# Patient Record
Sex: MALE | Race: WHITE | Employment: OTHER | URBAN - METROPOLITAN AREA
[De-identification: names, ages, dates, MRNs, and addresses within clinical notes are randomized per-mention and may not be internally consistent; named-entity substitution may affect disease eponyms.]

---

## 2018-08-06 ENCOUNTER — RECORDS - HEALTHEAST (OUTPATIENT)
Dept: LAB | Facility: CLINIC | Age: 83
End: 2018-08-06

## 2018-08-06 LAB
ANION GAP SERPL CALCULATED.3IONS-SCNC: 11 MMOL/L (ref 5–18)
BUN SERPL-MCNC: 30 MG/DL (ref 8–28)
CALCIUM SERPL-MCNC: 10 MG/DL (ref 8.5–10.5)
CHLORIDE BLD-SCNC: 104 MMOL/L (ref 98–107)
CO2 SERPL-SCNC: 29 MMOL/L (ref 22–31)
CREAT SERPL-MCNC: 1.5 MG/DL (ref 0.7–1.3)
GFR SERPL CREATININE-BSD FRML MDRD: 44 ML/MIN/1.73M2
GLUCOSE BLD-MCNC: 103 MG/DL (ref 70–125)
HBA1C MFR BLD: 5.4 % (ref 4.2–6.1)
HGB BLD-MCNC: 15.2 G/DL (ref 14–18)
POTASSIUM BLD-SCNC: 4.3 MMOL/L (ref 3.5–5)
SODIUM SERPL-SCNC: 144 MMOL/L (ref 136–145)

## 2018-09-07 ENCOUNTER — RECORDS - HEALTHEAST (OUTPATIENT)
Dept: LAB | Facility: CLINIC | Age: 83
End: 2018-09-07

## 2018-09-10 LAB
ANION GAP SERPL CALCULATED.3IONS-SCNC: 12 MMOL/L (ref 5–18)
BUN SERPL-MCNC: 19 MG/DL (ref 8–28)
CALCIUM SERPL-MCNC: 10.4 MG/DL (ref 8.5–10.5)
CHLORIDE BLD-SCNC: 101 MMOL/L (ref 98–107)
CO2 SERPL-SCNC: 27 MMOL/L (ref 22–31)
CREAT SERPL-MCNC: 1.22 MG/DL (ref 0.7–1.3)
GFR SERPL CREATININE-BSD FRML MDRD: 56 ML/MIN/1.73M2
GLUCOSE BLD-MCNC: 138 MG/DL (ref 70–125)
POTASSIUM BLD-SCNC: 4.3 MMOL/L (ref 3.5–5)
SODIUM SERPL-SCNC: 140 MMOL/L (ref 136–145)

## 2019-01-17 ENCOUNTER — RECORDS - HEALTHEAST (OUTPATIENT)
Dept: LAB | Facility: CLINIC | Age: 84
End: 2019-01-17

## 2019-01-17 LAB
25(OH)D3 SERPL-MCNC: 52.7 NG/ML (ref 30–80)
ALBUMIN SERPL-MCNC: 3.9 G/DL (ref 3.5–5)
ALP SERPL-CCNC: 104 U/L (ref 45–120)
ALT SERPL W P-5'-P-CCNC: 9 U/L (ref 0–45)
ANION GAP SERPL CALCULATED.3IONS-SCNC: 15 MMOL/L (ref 5–18)
AST SERPL W P-5'-P-CCNC: 19 U/L (ref 0–40)
BILIRUB SERPL-MCNC: 0.9 MG/DL (ref 0–1)
BUN SERPL-MCNC: 32 MG/DL (ref 8–28)
CALCIUM SERPL-MCNC: 9.7 MG/DL (ref 8.5–10.5)
CHLORIDE BLD-SCNC: 98 MMOL/L (ref 98–107)
CO2 SERPL-SCNC: 25 MMOL/L (ref 22–31)
CREAT SERPL-MCNC: 1.31 MG/DL (ref 0.7–1.3)
GFR SERPL CREATININE-BSD FRML MDRD: 51 ML/MIN/1.73M2
GLUCOSE BLD-MCNC: 122 MG/DL (ref 70–125)
HBA1C MFR BLD: 5.5 % (ref 4.2–6.1)
LDLC SERPL CALC-MCNC: 116 MG/DL
POTASSIUM BLD-SCNC: 4.7 MMOL/L (ref 3.5–5)
PROT SERPL-MCNC: 6.8 G/DL (ref 6–8)
SODIUM SERPL-SCNC: 138 MMOL/L (ref 136–145)

## 2019-02-25 ENCOUNTER — DOCUMENTATION ONLY (OUTPATIENT)
Dept: OTHER | Facility: CLINIC | Age: 84
End: 2019-02-25

## 2019-02-25 ENCOUNTER — AMBULATORY - HEALTHEAST (OUTPATIENT)
Dept: OTHER | Facility: CLINIC | Age: 84
End: 2019-02-25

## 2019-07-01 ENCOUNTER — RECORDS - HEALTHEAST (OUTPATIENT)
Dept: LAB | Facility: CLINIC | Age: 84
End: 2019-07-01

## 2019-07-01 LAB
ANION GAP SERPL CALCULATED.3IONS-SCNC: 14 MMOL/L (ref 5–18)
BUN SERPL-MCNC: 29 MG/DL (ref 8–28)
CALCIUM SERPL-MCNC: 10.1 MG/DL (ref 8.5–10.5)
CHLORIDE BLD-SCNC: 99 MMOL/L (ref 98–107)
CO2 SERPL-SCNC: 27 MMOL/L (ref 22–31)
CREAT SERPL-MCNC: 1.57 MG/DL (ref 0.7–1.3)
GFR SERPL CREATININE-BSD FRML MDRD: 42 ML/MIN/1.73M2
GLUCOSE BLD-MCNC: 147 MG/DL (ref 70–125)
HBA1C MFR BLD: 5.8 % (ref 4.2–6.1)
POTASSIUM BLD-SCNC: 4.3 MMOL/L (ref 3.5–5)
SODIUM SERPL-SCNC: 140 MMOL/L (ref 136–145)

## 2020-01-01 ENCOUNTER — TELEPHONE (OUTPATIENT)
Dept: GERIATRICS | Facility: CLINIC | Age: 85
End: 2020-01-01

## 2020-01-01 ENCOUNTER — VIRTUAL VISIT (OUTPATIENT)
Dept: GERIATRICS | Facility: CLINIC | Age: 85
End: 2020-01-01
Payer: MEDICARE

## 2020-01-01 ENCOUNTER — RECORDS - HEALTHEAST (OUTPATIENT)
Dept: LAB | Facility: CLINIC | Age: 85
End: 2020-01-01

## 2020-01-01 ENCOUNTER — NURSING HOME VISIT (OUTPATIENT)
Dept: FAMILY MEDICINE | Facility: CLINIC | Age: 85
End: 2020-01-01
Payer: MEDICARE

## 2020-01-01 ENCOUNTER — HOSPITAL LABORATORY (OUTPATIENT)
Facility: OTHER | Age: 85
End: 2020-01-01

## 2020-01-01 ENCOUNTER — TELEPHONE (OUTPATIENT)
Dept: FAMILY MEDICINE | Facility: CLINIC | Age: 85
End: 2020-01-01

## 2020-01-01 ENCOUNTER — DOCUMENTATION ONLY (OUTPATIENT)
Dept: OTHER | Facility: CLINIC | Age: 85
End: 2020-01-01

## 2020-01-01 VITALS
TEMPERATURE: 97.7 F | HEART RATE: 55 BPM | DIASTOLIC BLOOD PRESSURE: 75 MMHG | BODY MASS INDEX: 24.6 KG/M2 | HEIGHT: 70 IN | SYSTOLIC BLOOD PRESSURE: 119 MMHG | WEIGHT: 171.8 LBS | RESPIRATION RATE: 20 BRPM | OXYGEN SATURATION: 93 %

## 2020-01-01 VITALS
WEIGHT: 162.1 LBS | OXYGEN SATURATION: 96 % | RESPIRATION RATE: 12 BRPM | TEMPERATURE: 97.7 F | SYSTOLIC BLOOD PRESSURE: 136 MMHG | DIASTOLIC BLOOD PRESSURE: 83 MMHG | HEIGHT: 70 IN | BODY MASS INDEX: 23.21 KG/M2 | HEART RATE: 62 BPM

## 2020-01-01 VITALS
WEIGHT: 170.6 LBS | TEMPERATURE: 98.1 F | SYSTOLIC BLOOD PRESSURE: 124 MMHG | OXYGEN SATURATION: 97 % | BODY MASS INDEX: 24.48 KG/M2 | RESPIRATION RATE: 19 BRPM | HEART RATE: 52 BPM | DIASTOLIC BLOOD PRESSURE: 65 MMHG

## 2020-01-01 VITALS
OXYGEN SATURATION: 96 % | WEIGHT: 162.1 LBS | DIASTOLIC BLOOD PRESSURE: 83 MMHG | TEMPERATURE: 97 F | SYSTOLIC BLOOD PRESSURE: 136 MMHG | RESPIRATION RATE: 12 BRPM | BODY MASS INDEX: 23.26 KG/M2 | HEART RATE: 57 BPM

## 2020-01-01 VITALS
TEMPERATURE: 97.8 F | RESPIRATION RATE: 18 BRPM | OXYGEN SATURATION: 90 % | HEART RATE: 66 BPM | SYSTOLIC BLOOD PRESSURE: 137 MMHG | WEIGHT: 179 LBS | DIASTOLIC BLOOD PRESSURE: 84 MMHG

## 2020-01-01 VITALS
SYSTOLIC BLOOD PRESSURE: 112 MMHG | HEART RATE: 66 BPM | TEMPERATURE: 97.4 F | OXYGEN SATURATION: 94 % | BODY MASS INDEX: 23 KG/M2 | WEIGHT: 160.3 LBS | RESPIRATION RATE: 19 BRPM | DIASTOLIC BLOOD PRESSURE: 74 MMHG

## 2020-01-01 VITALS
SYSTOLIC BLOOD PRESSURE: 124 MMHG | BODY MASS INDEX: 24.24 KG/M2 | HEIGHT: 70 IN | HEART RATE: 67 BPM | RESPIRATION RATE: 18 BRPM | OXYGEN SATURATION: 98 % | DIASTOLIC BLOOD PRESSURE: 60 MMHG | WEIGHT: 169.3 LBS | TEMPERATURE: 98.1 F

## 2020-01-01 DIAGNOSIS — N18.30 TYPE 2 DIABETES MELLITUS WITH STAGE 3 CHRONIC KIDNEY DISEASE, WITHOUT LONG-TERM CURRENT USE OF INSULIN (H): ICD-10-CM

## 2020-01-01 DIAGNOSIS — C78.01 MALIGNANT NEOPLASM METASTATIC TO RIGHT LUNG (H): ICD-10-CM

## 2020-01-01 DIAGNOSIS — L89.600 PRESSURE INJURY OF HEEL, UNSTAGEABLE, UNSPECIFIED LATERALITY (H): ICD-10-CM

## 2020-01-01 DIAGNOSIS — C43.21 MALIGNANT MELANOMA OF RIGHT EAR (H): ICD-10-CM

## 2020-01-01 DIAGNOSIS — I10 ESSENTIAL HYPERTENSION: ICD-10-CM

## 2020-01-01 DIAGNOSIS — L89.301 PRESSURE INJURY OF BUTTOCK, STAGE 1, UNSPECIFIED LATERALITY: ICD-10-CM

## 2020-01-01 DIAGNOSIS — E11.22 TYPE 2 DIABETES MELLITUS WITH STAGE 3 CHRONIC KIDNEY DISEASE, WITHOUT LONG-TERM CURRENT USE OF INSULIN (H): ICD-10-CM

## 2020-01-01 DIAGNOSIS — M15.0 PRIMARY OSTEOARTHRITIS INVOLVING MULTIPLE JOINTS: ICD-10-CM

## 2020-01-01 DIAGNOSIS — D62 ACUTE BLOOD LOSS ANEMIA: ICD-10-CM

## 2020-01-01 DIAGNOSIS — F41.9 ANXIETY: ICD-10-CM

## 2020-01-01 DIAGNOSIS — K92.2 ACUTE GI BLEEDING: ICD-10-CM

## 2020-01-01 DIAGNOSIS — Z51.5 HOSPICE CARE PATIENT: Primary | ICD-10-CM

## 2020-01-01 DIAGNOSIS — R13.10 DYSPHAGIA, UNSPECIFIED TYPE: ICD-10-CM

## 2020-01-01 DIAGNOSIS — R63.4 WEIGHT LOSS: ICD-10-CM

## 2020-01-01 DIAGNOSIS — E11.9 DIABETES MELLITUS TYPE 2, DIET-CONTROLLED (H): ICD-10-CM

## 2020-01-01 DIAGNOSIS — I26.99 ACUTE PULMONARY EMBOLISM WITHOUT ACUTE COR PULMONALE, UNSPECIFIED PULMONARY EMBOLISM TYPE (H): ICD-10-CM

## 2020-01-01 DIAGNOSIS — R91.8 HILAR MASS: Primary | ICD-10-CM

## 2020-01-01 DIAGNOSIS — M35.3 POLYMYALGIA RHEUMATICA (H): ICD-10-CM

## 2020-01-01 DIAGNOSIS — R91.8 HILAR MASS: ICD-10-CM

## 2020-01-01 DIAGNOSIS — Z51.5 HOSPICE CARE PATIENT: ICD-10-CM

## 2020-01-01 DIAGNOSIS — N18.30 CKD (CHRONIC KIDNEY DISEASE) STAGE 3, GFR 30-59 ML/MIN (H): ICD-10-CM

## 2020-01-01 DIAGNOSIS — D62 ANEMIA DUE TO BLOOD LOSS, ACUTE: ICD-10-CM

## 2020-01-01 DIAGNOSIS — U07.1 COVID-19: Primary | ICD-10-CM

## 2020-01-01 DIAGNOSIS — R54 FRAILTY: ICD-10-CM

## 2020-01-01 DIAGNOSIS — C74.90: ICD-10-CM

## 2020-01-01 DIAGNOSIS — L89.600 PRESSURE INJURY OF HEEL, UNSTAGEABLE, UNSPECIFIED LATERALITY (H): Primary | ICD-10-CM

## 2020-01-01 DIAGNOSIS — K92.2 ACUTE GI BLEEDING: Primary | ICD-10-CM

## 2020-01-01 DIAGNOSIS — F02.80 LATE ONSET ALZHEIMER'S DISEASE WITHOUT BEHAVIORAL DISTURBANCE (H): ICD-10-CM

## 2020-01-01 DIAGNOSIS — G30.1 LATE ONSET ALZHEIMER'S DISEASE WITHOUT BEHAVIORAL DISTURBANCE (H): ICD-10-CM

## 2020-01-01 LAB
25(OH)D3 SERPL-MCNC: 47.9 NG/ML (ref 30–80)
ANION GAP SERPL CALCULATED.3IONS-SCNC: 12 MMOL/L (ref 5–18)
ANION GAP SERPL CALCULATED.3IONS-SCNC: 7 MMOL/L (ref 3–14)
BUN SERPL-MCNC: 21 MG/DL (ref 7–30)
BUN SERPL-MCNC: 30 MG/DL (ref 8–28)
CALCIUM SERPL-MCNC: 10 MG/DL (ref 8.5–10.5)
CALCIUM SERPL-MCNC: 8.4 MG/DL (ref 8.5–10.1)
CHLORIDE BLD-SCNC: 102 MMOL/L (ref 98–107)
CHLORIDE SERPL-SCNC: 101 MMOL/L (ref 94–109)
CO2 SERPL-SCNC: 27 MMOL/L (ref 20–32)
CO2 SERPL-SCNC: 27 MMOL/L (ref 22–31)
CREAT SERPL-MCNC: 0.69 MG/DL (ref 0.66–1.25)
CREAT SERPL-MCNC: 1.5 MG/DL (ref 0.7–1.3)
ERYTHROCYTE [DISTWIDTH] IN BLOOD BY AUTOMATED COUNT: 12.8 % (ref 11–14.5)
GFR SERPL CREATININE-BSD FRML MDRD: 44 ML/MIN/1.73M2
GFR SERPL CREATININE-BSD FRML MDRD: 82 ML/MIN/{1.73_M2}
GLUCOSE BLD-MCNC: 92 MG/DL (ref 70–125)
GLUCOSE SERPL-MCNC: 72 MG/DL (ref 70–99)
HBA1C MFR BLD: 5.3 % (ref 4.2–6.1)
HCT VFR BLD AUTO: 45.7 % (ref 40–54)
HGB BLD-MCNC: 15.3 G/DL (ref 14–18)
MCH RBC QN AUTO: 32.1 PG (ref 27–34)
MCHC RBC AUTO-ENTMCNC: 33.5 G/DL (ref 32–36)
MCV RBC AUTO: 96 FL (ref 80–100)
PLATELET # BLD AUTO: 283 THOU/UL (ref 140–440)
PMV BLD AUTO: 9.8 FL (ref 8.5–12.5)
POTASSIUM BLD-SCNC: 4.3 MMOL/L (ref 3.5–5)
POTASSIUM SERPL-SCNC: 3.8 MMOL/L (ref 3.4–5.3)
RBC # BLD AUTO: 4.76 MILL/UL (ref 4.4–6.2)
SODIUM SERPL-SCNC: 135 MMOL/L (ref 133–144)
SODIUM SERPL-SCNC: 141 MMOL/L (ref 136–145)
WBC: 8 THOU/UL (ref 4–11)

## 2020-01-01 PROCEDURE — 99207 PR NO CHARGE LOS: CPT | Performed by: NURSE PRACTITIONER

## 2020-01-01 PROCEDURE — 99308 SBSQ NF CARE LOW MDM 20: CPT | Mod: GW | Performed by: NURSE PRACTITIONER

## 2020-01-01 PROCEDURE — 99305 1ST NF CARE MODERATE MDM 35: CPT | Mod: 95 | Performed by: FAMILY MEDICINE

## 2020-01-01 PROCEDURE — 99309 SBSQ NF CARE MODERATE MDM 30: CPT | Mod: 95 | Performed by: FAMILY MEDICINE

## 2020-01-01 PROCEDURE — 99309 SBSQ NF CARE MODERATE MDM 30: CPT | Mod: GW | Performed by: NURSE PRACTITIONER

## 2020-01-01 PROCEDURE — 99310 SBSQ NF CARE HIGH MDM 45: CPT | Mod: GW | Performed by: NURSE PRACTITIONER

## 2020-01-01 RX ORDER — HYDROCORTISONE ACETATE 25 MG/1
25 SUPPOSITORY RECTAL DAILY PRN
COMMUNITY

## 2020-01-01 RX ORDER — DEXAMETHASONE 2 MG/1
2 TABLET ORAL EVERY MORNING
COMMUNITY
End: 2020-01-01

## 2020-01-01 RX ORDER — MORPHINE SULFATE 100 %
2.5 POWDER (GRAM) MISCELLANEOUS EVERY 4 HOURS PRN
COMMUNITY
Start: 2020-01-01 | End: 2020-01-01

## 2020-01-01 RX ORDER — MORPHINE SULFATE 30 MG/1
2.5 TABLET ORAL EVERY 4 HOURS PRN
COMMUNITY
End: 2020-01-01

## 2020-01-01 RX ORDER — CHOLECALCIFEROL (VITAMIN D3) 50 MCG
2000 TABLET ORAL DAILY
COMMUNITY
End: 2020-01-01

## 2020-01-01 RX ORDER — MORPHINE SULFATE 30 MG/1
2.5 TABLET ORAL 2 TIMES DAILY
COMMUNITY
End: 2020-01-01

## 2020-01-01 RX ORDER — ACETAMINOPHEN 500 MG
1000 TABLET ORAL 3 TIMES DAILY
COMMUNITY
Start: 2020-01-01

## 2020-01-01 RX ORDER — LORAZEPAM 0.5 MG/1
0.5 TABLET ORAL EVERY 6 HOURS PRN
OUTPATIENT
Start: 2020-01-01

## 2020-01-01 RX ORDER — MORPHINE SULFATE 30 MG/1
5 TABLET ORAL 3 TIMES DAILY
COMMUNITY

## 2020-01-01 RX ORDER — LORAZEPAM 0.5 MG/1
0.5 TABLET ORAL EVERY 6 HOURS PRN
COMMUNITY

## 2020-01-01 RX ORDER — SENNOSIDES A AND B 8.6 MG/1
2 TABLET, FILM COATED ORAL 2 TIMES DAILY PRN
COMMUNITY
Start: 2020-01-01

## 2020-01-01 RX ORDER — DEXAMETHASONE 4 MG/1
4 TABLET ORAL
COMMUNITY

## 2020-01-01 RX ORDER — POLYETHYLENE GLYCOL 3350 17 G/17G
1 POWDER, FOR SOLUTION ORAL DAILY
COMMUNITY

## 2020-01-01 RX ORDER — AMLODIPINE BESYLATE 5 MG/1
2.5 TABLET ORAL DAILY
COMMUNITY
Start: 2020-01-01 | End: 2020-01-01

## 2020-01-01 RX ORDER — TRAMADOL HYDROCHLORIDE 50 MG/1
50 TABLET ORAL EVERY 4 HOURS PRN
COMMUNITY
Start: 2020-01-01 | End: 2020-01-01

## 2020-01-01 RX ORDER — LORAZEPAM 100 %
0.5 POWDER (GRAM) MISCELLANEOUS EVERY 6 HOURS PRN
COMMUNITY
Start: 2020-01-01 | End: 2020-01-01

## 2020-01-01 ASSESSMENT — MIFFLIN-ST. JEOR
SCORE: 1424.19
SCORE: 1391.53
SCORE: 1435.53

## 2020-06-25 NOTE — LETTER
6/25/2020        RE: Chaparro Foley  [address] [facility] [address]        [facility]  PRIMARY CARE PROVIDER AND CLINIC:  Outside, Provider No address on file  Chief Complaint   Patient presents with     group home Acute     [place] Medical Record Number:  [id number]  Place of Service where encounter took place:  No question data found.    HPI:    Chaparro Foley is a 89 year old  (9/15/1927),admitted to the above facility from  [facility] .  Hospital stay 6/8/2020 through 6/18/2020.  Admitted to this facility for  hospice.  HPI information obtained from: facility chart records, facility staff, patient report and Care Everywhere Epic chart review.  Current issues are:        BRIEF HOSPITAL COURSE     Chaparro Foley is a 89 y.o. male with a past medical history pertinent for CKD stage III, malignant melanoma of right ear (admit W 02/11 - 02/13/20 for a partial right auriculectomy and right neck dissection), hypertension, hyperlipidemia, and DJD of multiple joints was admitted to [facility] on 6/9/2020 with acute GI bleed- presumed diverticular bleed.         Three days prior to admit pt started having bright red rectal bleeding, with the blood mixed in with the stool. On the day before admission, the patient noted that the blood was then dark red, prompting him to present to the ED. Denies rectal pain. In ED, hemodynamically stable, labs notable for Na 134, Cl 96, BUN 36, creatinine 1.65. 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 Did also develop slight tachycardia, but otherwise asymptomatic. During this time  patient's Hgb trended down from 13.5 to 9.6.     GI was consulted and patient underwent EGD and colonoscopy on - neither showed signs of bleeding. Tagged RBC scan also showed without evidence of active GI bleed. CT Enterography done  and showed no obvious source of bleeding. Pill cam was done on 6/15 showed no evidence of bleeding so diverticular bleed is the presumed etiology of pt's GI bleed. Pt was transfused one unit pRBC on . Hgb has been in the 9-10 range since with no further bleeding.    CT did show PE which was also noted on prior CT. Patient's not a candidate for anticoagulation given GI    CT scan findings of hilar and adrenal masses most likely due to metastatic melanoma. Pt had resection of ear and neck in 2020. He may have had CT scan of chest prior to that but I was unable to locate it. Pt aware of the CT scan findings. He said he would not want further evaluation of this.     His quality of life has been decreasing due to decreased mobility and pain from DJD of both upper and lower extremities. Pt lives in an assisted living apartment and his wife (with Alzheimer's) lives in the memory care and hasn't recognized him for a couple of years. His only child/son  several years ago. His closest person is Constantine who is also his POA. Constantine recently moved to [place] and he wants the patient to live closer to him. Pt was in agreement with this plan. Palliative Care was consulted as was Hospice (more for future support).     Pt's blood pressure running lower due to GI bleed so Atenolol and Maxzide were stopped and Amlodipine was decreased from 5 mg to 2.5 mg/d    Patient discharged to [facility] in [place] with [facility] support       Patient denies any pain, shortness of breath  Reports normal functioning of bowel and bladder   Appetite is good  Using EZ lift for transfers   Reports buttocks is sore but declines to have me assess it     CODE STATUS/ADVANCE DIRECTIVES  DISCUSSION:   DNR / DNI  Patient's living condition: lives in a skilled nursing facility    ALLERGIES:Lisinopril; Losartan; Atorvastatin; and Penicillins  PAST MEDICAL HISTORY:  has no past medical history on file.  PAST SURGICAL HISTORY:  has no past surgical history on file.  FAMILY HISTORY: family history is not on file.  SOCIAL HISTORY:      Post Discharge Medication Reconciliation Status: discharge medications reconciled, continue medications without change.  Current Outpatient Medications   Medication Sig Dispense Refill     acetaminophen (TYLENOL) 500 MG tablet Take 1,000 mg by mouth 3 times daily       Lorazepam POWD 0.5 mg by Oral or Feeding Tube route every 6 hours as needed       morphine powder Place 2.5 mg under the tongue every 4 hours as needed       senna (SENNA-TIME) 8.6 MG tablet Take 17.2 mg by mouth 2 times daily as needed       traMADol (ULTRAM) 50 MG tablet Take 50 mg by mouth every 4 hours as needed       amLODIPine (NORVASC) 5 MG tablet 2.5 mg by Oral or Feeding Tube route daily       Ca Phosphate-Cholecalciferol (CALCIUM/VITAMIN D3 GUMMIES) 200-200 MG-UNIT CHEW 1 Dose by Oral or Feeding Tube route daily         ROS:  10 point ROS of systems including Constitutional, Eyes, Respiratory, Cardiovascular, Gastroenterology, Genitourinary, Integumentary, Musculoskeletal, Psychiatric were all negative except for pertinent positives noted in my HPI.    Exam:  /84   Pulse 66   Temp 97.8  F (36.6  C)   Resp 18   Wt 81.2 kg (179 lb)   SpO2 90%   GENERAL APPEARANCE:  Alert, in no distress  EYES:  EOM, conjunctivae, lids, pupils and irises normal  RESP:  respiratory effort and palpation of chest normal, lungs clear to auscultation , no respiratory distress  CV:  Palpation and auscultation of heart done , regular rate and rhythm, no murmur, rub, or gallop  ABDOMEN:  normal bowel sounds, soft, nontender, no hepatosplenomegaly or other masses  M/S:   Gait and station abnormal EZ lift  Digits and  nails normal  SKIN:  Inspection of skin and subcutaneous tissue baseline, Palpation of skin and subcutaneous tissue baseline  NEURO:   Cranial nerves 2-12 are normal tested and grossly at patient's baseline  PSYCH:  oriented X 3          ASSESSMENT/PLAN:  Hospice care patient  Hilar mass     CT Abdomen pelvis showed new lobulated low-density right hilar mass, worrisome for metastatic lymphadenopathy, new nodular enlargement of both adrenal glands 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- patient declined further work up   Palliative consulted while inpatient   Patient is now enrolled in hospice through [facility]   Symptoms well managed     Acute GI bleeding  Acute blood loss anemia  Felt to be diverticular bleeding   No active bleeding currently     Acute pulmonary embolism without acute cor pulmonale, unspecified pulmonary embolism type (H)  Noted on CT scan   Not on  Due to goals of care and GI bleed    Primary osteoarthritis involving multiple joints  On tylenol scheduled and tramadol  Requires EZ lift for transfers and extensive assistance from nursing with ADLS     Malignant melanoma of right ear (H)  S/p resection in 2016    Type 2 diabetes mellitus with stage 3 chronic kidney disease, without long-term current use of insulin (H)  Diet controlled     CKD (chronic kidney disease) stage 3, GFR 30-59 ml/min (H)  Avoid nephrotoxic medication        Orders:  Nursing to discuss pressure concerns with hospice and possible cushion for recliner.     Total time spent with patient visit at the skilled nursing facility was 35 min including patient visit and review of past records. Greater than 50% of total time spent with counseling and coordinating care due to new admssion.     Electronically signed by:  Mili Hammond NP                    Sincerely,        Mili Hammond NP

## 2020-06-28 PROBLEM — D62 ACUTE BLOOD LOSS ANEMIA: Status: ACTIVE | Noted: 2020-01-01

## 2020-06-28 PROBLEM — E78.5 HYPERLIPIDEMIA: Status: ACTIVE | Noted: 2020-01-01

## 2020-06-28 PROBLEM — E11.22 DIABETES MELLITUS WITH CHRONIC KIDNEY DISEASE (H): Status: ACTIVE | Noted: 2020-01-01

## 2020-06-28 PROBLEM — M15.9 OSTEOARTHRITIS OF MULTIPLE JOINTS: Status: ACTIVE | Noted: 2020-01-01

## 2020-06-28 PROBLEM — R91.8 HILAR MASS: Status: ACTIVE | Noted: 2020-01-01

## 2020-06-28 PROBLEM — I26.99 ACUTE PULMONARY EMBOLISM WITHOUT ACUTE COR PULMONALE (H): Status: ACTIVE | Noted: 2020-01-01

## 2020-06-28 PROBLEM — K92.2 ACUTE GI BLEEDING: Status: ACTIVE | Noted: 2020-01-01

## 2020-06-28 NOTE — TELEPHONE ENCOUNTER
ON-CALL  GERIATRICS CROSS-COVERAGE NOTE:    Report from RN that patient had a fall.  Reportedly, he had pushed the wrong button on his lift chair.  Patient did not hit his head and there is no signs or symptoms of traumatic injury per RN.  He is currently enrolled with hospice and does not take aspirin or anticoagulation medication.    Plan:  Monitor for mental status change or other symptoms indicative of traumatic injury    Brett Galloway MD

## 2020-06-29 NOTE — PROGRESS NOTES
[facility]  PRIMARY CARE PROVIDER AND CLINIC:  Outside, Provider No address on file  Chief Complaint   Patient presents with     prison Acute     [place] Medical Record Number:  [id number]  Place of Service where encounter took place:  No question data found.    HPI:    Chaparro Foley is a 89 year old  (9/15/1927),admitted to the above facility from  [facility] .  Hospital stay 6/8/2020 through 6/18/2020.  Admitted to this facility for  hospice.  HPI information obtained from: facility chart records, facility staff, patient report and Care Everywhere Epic chart review.  Current issues are:        BRIEF HOSPITAL COURSE     Chaparro Foley is a 89 y.o. male with a past medical history pertinent for CKD stage III, malignant melanoma of right ear (admit W 02/11 - 02/13/20 for a partial right auriculectomy and right neck dissection), hypertension, hyperlipidemia, and DJD of multiple joints was admitted to [facility] on 6/9/2020 with acute GI bleed- presumed diverticular bleed.         Three days prior to admit pt started having bright red rectal bleeding, with the blood mixed in with the stool. On the day before admission, the patient noted that the blood was then dark red, prompting him to present to the ED. Denies rectal pain. In ED, hemodynamically stable, labs notable for Na 134, Cl 96, BUN 36, creatinine 1.65. 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 Did also develop slight tachycardia, but otherwise asymptomatic. During this time patient's Hgb trended down from 13.5 to 9.6.     GI was consulted and patient underwent EGD  and colonoscopy on - neither showed signs of bleeding. Tagged RBC scan also showed without evidence of active GI bleed. CT Enterography done  and showed no obvious source of bleeding. Pill cam was done on 6/15 showed no evidence of bleeding so diverticular bleed is the presumed etiology of pt's GI bleed. Pt was transfused one unit pRBC on . Hgb has been in the 9-10 range since with no further bleeding.    CT did show PE which was also noted on prior CT. Patient's not a candidate for anticoagulation given GI    CT scan findings of hilar and adrenal masses most likely due to metastatic melanoma. Pt had resection of ear and neck in 2020. He may have had CT scan of chest prior to that but I was unable to locate it. Pt aware of the CT scan findings. He said he would not want further evaluation of this.     His quality of life has been decreasing due to decreased mobility and pain from DJD of both upper and lower extremities. Pt lives in an assisted living apartment and his wife (with Alzheimer's) lives in the memory care and hasn't recognized him for a couple of years. His only child/son  several years ago. His closest person is Constantine who is also his POA. Constantine recently moved to [place] and he wants the patient to live closer to him. Pt was in agreement with this plan. Palliative Care was consulted as was Hospice (more for future support).     Pt's blood pressure running lower due to GI bleed so Atenolol and Maxzide were stopped and Amlodipine was decreased from 5 mg to 2.5 mg/d    Patient discharged to [facility] in [place] with [facility] support       Patient denies any pain, shortness of breath  Reports normal functioning of bowel and bladder   Appetite is good  Using EZ lift for transfers   Reports buttocks is sore but declines to have me assess it     CODE STATUS/ADVANCE DIRECTIVES DISCUSSION:   DNR / DNI  Patient's living condition: lives in a skilled nursing  facility    ALLERGIES:Lisinopril; Losartan; Atorvastatin; and Penicillins  PAST MEDICAL HISTORY:  has no past medical history on file.  PAST SURGICAL HISTORY:  has no past surgical history on file.  FAMILY HISTORY: family history is not on file.  SOCIAL HISTORY:      Post Discharge Medication Reconciliation Status: discharge medications reconciled, continue medications without change.  Current Outpatient Medications   Medication Sig Dispense Refill     acetaminophen (TYLENOL) 500 MG tablet Take 1,000 mg by mouth 3 times daily       Lorazepam POWD 0.5 mg by Oral or Feeding Tube route every 6 hours as needed       morphine powder Place 2.5 mg under the tongue every 4 hours as needed       senna (SENNA-TIME) 8.6 MG tablet Take 17.2 mg by mouth 2 times daily as needed       traMADol (ULTRAM) 50 MG tablet Take 50 mg by mouth every 4 hours as needed       amLODIPine (NORVASC) 5 MG tablet 2.5 mg by Oral or Feeding Tube route daily       Ca Phosphate-Cholecalciferol (CALCIUM/VITAMIN D3 GUMMIES) 200-200 MG-UNIT CHEW 1 Dose by Oral or Feeding Tube route daily         ROS:  10 point ROS of systems including Constitutional, Eyes, Respiratory, Cardiovascular, Gastroenterology, Genitourinary, Integumentary, Musculoskeletal, Psychiatric were all negative except for pertinent positives noted in my HPI.    Exam:  /84   Pulse 66   Temp 97.8  F (36.6  C)   Resp 18   Wt 81.2 kg (179 lb)   SpO2 90%   GENERAL APPEARANCE:  Alert, in no distress  EYES:  EOM, conjunctivae, lids, pupils and irises normal  RESP:  respiratory effort and palpation of chest normal, lungs clear to auscultation , no respiratory distress  CV:  Palpation and auscultation of heart done , regular rate and rhythm, no murmur, rub, or gallop  ABDOMEN:  normal bowel sounds, soft, nontender, no hepatosplenomegaly or other masses  M/S:   Gait and station abnormal EZ lift  Digits and nails normal  SKIN:  Inspection of skin and subcutaneous tissue baseline,  Palpation of skin and subcutaneous tissue baseline  NEURO:   Cranial nerves 2-12 are normal tested and grossly at patient's baseline  PSYCH:  oriented X 3          ASSESSMENT/PLAN:  Hospice care patient  Hilar mass     CT Abdomen pelvis showed new lobulated low-density right hilar mass, worrisome for metastatic lymphadenopathy, new nodular enlargement of both adrenal glands 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- patient declined further work up   Palliative consulted while inpatient   Patient is now enrolled in hospice through [facility]   Symptoms well managed     Acute GI bleeding  Acute blood loss anemia  Felt to be diverticular bleeding   No active bleeding currently     Acute pulmonary embolism without acute cor pulmonale, unspecified pulmonary embolism type (H)  Noted on CT scan   Not on  Due to goals of care and GI bleed    Primary osteoarthritis involving multiple joints  On tylenol scheduled and tramadol  Requires EZ lift for transfers and extensive assistance from nursing with ADLS     Malignant melanoma of right ear (H)  S/p resection in 2016    Type 2 diabetes mellitus with stage 3 chronic kidney disease, without long-term current use of insulin (H)  Diet controlled     CKD (chronic kidney disease) stage 3, GFR 30-59 ml/min (H)  Avoid nephrotoxic medication        Orders:  Nursing to discuss pressure concerns with hospice and possible cushion for recliner.     Total time spent with patient visit at the skilled nursing facility was 35 min including patient visit and review of past records. Greater than 50% of total time spent with counseling and coordinating care due to new admssion.     Electronically signed by:  Mili Hammond NP

## 2020-07-15 PROBLEM — C79.70: Status: ACTIVE | Noted: 2020-01-01

## 2020-07-15 PROBLEM — Z51.5 ENCOUNTER FOR HOSPICE CARE: Status: ACTIVE | Noted: 2020-01-01

## 2020-07-15 PROBLEM — M35.3 POLYMYALGIA RHEUMATICA (H): Status: ACTIVE | Noted: 2020-01-01

## 2020-07-15 NOTE — PROGRESS NOTES
" [facility]  Chaparro Foley is being evaluated via a billable video visit due to the restrictions of the Covid-19 pandemic.   The patient has been notified of following:  \"This video visit will be conducted via a call between you and your provider. We have found that certain health care needs can be provided without the need for an in-person physical exam.  This service lets us provide the care you need with a video conversation. If during the course of the call the provider feels a video visit is not appropriate, you will not be charged for this service.\"   The provider has received verbal consent for a Video Visit from the patient and or first contact? Yes  Patient/facility staff would like the video invitation sent by: N/A   Video Start Time: 12:27  Which Facility the Patient is at during the time of visit: [facility]  PRIMARY CARE PROVIDER AND CLINIC: Ruby Vasquez MD, [address] [facility] [[email or web address] [address] / [place] [address]  Chief Complaint   Patient presents with     [facility] F/U     [place] Medical Record Number: [id number]  Chaparro Foley is a 89 year old  (9/15/1927), admitted to the above facility from  [facility] . Hospital stay 06/08/20 through 06/18/20. Admitted to this facility for rehab, medical management, nursing care and hospice.  HPI:    HPI information obtained from: facility staff, patient report and [facility] chart review.   Brief Summary of Hospital Course:   - Pt with PMH notable for Malignant melanoma s/p partial right auriculectomy and right neck dissection (Feb 2020), DJD, PE, Alzheimer disease (U resident),  hospitalized with Acute GIB presumably from diverticulosis.  CT revealed right hilar mass concerning for mets lymphadenopathy, and new b/l adrenal gland nodular enlargement c/w mets. Complicated with acute blood loss anemia, required one unit of PRBCs transfusion. Enrolled in hospice.     Updates on Status Since " "Skilled nursing Admission:   - I have ain in both knees, OA, better with morphine. \"My appetite is very good, BM is ok, sleep is fine as well.   - \" My mood is good.\"   - \" the bedsores over my buttock and over my heels are getting better\" RN reports patient has Mepilex on the heels    =========================================================  CODE STATUS/ADVANCE DIRECTIVES DISCUSSION: DNR / DNI  Patient's living condition: lives in an assisted living facility (apartment) with his spouse  ALLERGIES: Lisinopril; Losartan; Atorvastatin; and Penicillins  PAST MEDICAL HISTORY:  has a past medical history of Chronic kidney disease, Diabetes mellitus, type 2 (H), Hyperlipidemia, and Hypertension.  PAST SURGICAL HISTORY:   has a past surgical history that includes Cholecystectomy. EGD, c-scope, lap julián.  s/p partial right auriculectomy and right neck dissection (2020),  FAMILY HISTORY: family history includes Brain Tumor in his son; Heart Failure in his mother.father  from old age.   SOCIAL HISTORY:   former smoker quite in , does not drink alcohol. Lives in memory care unit.   Current Outpatient Medications   Medication Sig Dispense Refill     acetaminophen (TYLENOL) 500 MG tablet Take 1,000 mg by mouth 3 times daily       amLODIPine (NORVASC) 5 MG tablet 2.5 mg by Oral or Feeding Tube route daily       Ca Phosphate-Cholecalciferol (CALCIUM/VITAMIN D3 GUMMIES) 200-200 MG-UNIT CHEW 1 Dose by Oral or Feeding Tube route daily       Lorazepam POWD 0.5 mg by Oral or Feeding Tube route every 6 hours as needed       morphine powder Place 2.5 mg under the tongue every 4 hours as needed       senna (SENNA-TIME) 8.6 MG tablet Take 17.2 mg by mouth 2 times daily as needed       traMADol (ULTRAM) 50 MG tablet Take 50 mg by mouth every 4 hours as needed       ROS: Limited secondary to cognitive impairment but today pt reports- see HPI  Vitals:/75   Pulse 55   Temp 97.7  F (36.5  C)   Resp 20   Ht 1.778 m (5' " "10\")   Wt 77.9 kg (171 lb 12.8 oz)   SpO2 93%   BMI 24.65 kg/m     Limited Visit Exam done given COVID-19 precautions:  GENERAL APPEARANCE:  in no distress  RESP:  unlabored breathing  M/S:   no joint deformity noted  SKIN:  no rash noted  NEURO:   no purposeful movement in upper and lower extremities  PSYCH:  affect and mood normal    Lab/Diagnostic data: Reviewed in the chart and EHR.      ASSESSMENT/PLAN:  -----------------------------  Acute GI bleeding presumably from diverticular bleeding.   Acute blood loss anemia required blood transfusion  - clinically stable. At risk for re-bleeding. 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 Chronic condition, ongoing decline expected.   -  Continue to provide redirection and reassurance as needed. Maintain safe living situation with goals focused on comfort.    Unstageable B/L heel injury (H): wound care. Continue    Diet controlled Diabetes (H): no diet restriction    Primary osteoarthritis involving multiple joints  Polymyalgia rheumatic  Frailty  - analgesia optimal  - Significant  Deficits requiring NH placement. Requiring extensive assistance from nursing. 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 Requiring extensive assistance from nursing. Up for meals only o/w spends the day resting in bed    Order: See above, otherwise, continue the rest of the current POC.       Electronically signed by:  Ruby Vasquez MD     Video-Visit Details  Type of service:  Video Visit  Video End Time (time video stopped): 12:27  Distant Location (provider location):  [facility]   "

## 2020-07-15 NOTE — LETTER
"    7/15/2020        RE: Chaparro Foley  [address] [facility] [address]         [facility] [facility]  Chaparro Foley is being evaluated via a billable video visit due to the restrictions of the Covid-19 pandemic.   The patient has been notified of following:  \"This video visit will be conducted via a call between you and your provider. We have found that certain health care needs can be provided without the need for an in-person physical exam.  This service lets us provide the care you need with a video conversation. If during the course of the call the provider feels a video visit is not appropriate, you will not be charged for this service.\"   The provider has received verbal consent for a Video Visit from the patient and or first contact? Yes  Patient/facility staff would like the video invitation sent by: N/A   Video Start Time: 12:27  Which Facility the Patient is at during the time of visit: [facility]  PRIMARY CARE PROVIDER AND CLINIC: Ruby Vasquez MD, [address] [place] [address] / [facility] [address]  Chief Complaint   Patient presents with     [facility] F/U     [place] Medical Record Number: [id number]  Chaparro Foley is a 89 year old  (9/15/1927), admitted to the above facility from  [facility] . Hospital stay 06/08/20 through 06/18/20. Admitted to this facility for rehab, medical management, nursing care and hospice.  HPI:    HPI information obtained from: facility staff, patient report and [facility] chart review.   Brief Summary of Hospital Course:   - Pt with PMH notable for Malignant melanoma s/p partial right auriculectomy and right neck dissection (Feb 2020), DJD, PE, Alzheimer disease (U resident),  hospitalized with Acute GIB presumably from diverticulosis.  CT revealed right hilar mass concerning for mets lymphadenopathy, and new b/l adrenal gland nodular enlargement c/w mets. Complicated with acute blood loss anemia, " "required one unit of PRBCs transfusion. Enrolled in hospice.     Updates on Status Since Skilled nursing Admission:   - I have ain in both knees, OA, better with morphine. \"My appetite is very good, BM is ok, sleep is fine as well.   - \" My mood is good.\"   - \" the bedsores over my buttock and over my heels are getting better\" RN reports patient has Mepilex on the heels    =========================================================  CODE STATUS/ADVANCE DIRECTIVES DISCUSSION: DNR / DNI  Patient's living condition: lives in an assisted living facility (apartment) with his spouse  ALLERGIES: Lisinopril; Losartan; Atorvastatin; and Penicillins  PAST MEDICAL HISTORY:  has a past medical history of Chronic kidney disease, Diabetes mellitus, type 2 (H), Hyperlipidemia, and Hypertension.  PAST SURGICAL HISTORY:   has a past surgical history that includes Cholecystectomy. EGD, c-scope, lap julián.  s/p partial right auriculectomy and right neck dissection (2020),  FAMILY HISTORY: family history includes Brain Tumor in his son; Heart Failure in his mother.father  from old age.   SOCIAL HISTORY:   former smoker quite in , does not drink alcohol. Lives in memory care unit.   Current Outpatient Medications   Medication Sig Dispense Refill     acetaminophen (TYLENOL) 500 MG tablet Take 1,000 mg by mouth 3 times daily       amLODIPine (NORVASC) 5 MG tablet 2.5 mg by Oral or Feeding Tube route daily       Ca Phosphate-Cholecalciferol (CALCIUM/VITAMIN D3 GUMMIES) 200-200 MG-UNIT CHEW 1 Dose by Oral or Feeding Tube route daily       Lorazepam POWD 0.5 mg by Oral or Feeding Tube route every 6 hours as needed       morphine powder Place 2.5 mg under the tongue every 4 hours as needed       senna (SENNA-TIME) 8.6 MG tablet Take 17.2 mg by mouth 2 times daily as needed       traMADol (ULTRAM) 50 MG tablet Take 50 mg by mouth every 4 hours as needed       ROS: Limited secondary to cognitive impairment but today pt reports- see " "HPI  Vitals:/75   Pulse 55   Temp 97.7  F (36.5  C)   Resp 20   Ht 1.778 m (5' 10\")   Wt 77.9 kg (171 lb 12.8 oz)   SpO2 93%   BMI 24.65 kg/m     Limited Visit Exam done given COVID-19 precautions:  GENERAL APPEARANCE:  in no distress  RESP:  unlabored breathing  M/S:   no joint deformity noted  SKIN:  no rash noted  NEURO:   no purposeful movement in upper and lower extremities  PSYCH:  affect and mood normal    Lab/Diagnostic data: Reviewed in the chart and EHR.      ASSESSMENT/PLAN:  -----------------------------  Acute GI bleeding presumably from diverticular bleeding.   Acute blood loss anemia required blood transfusion  - clinically stable. At risk for re-bleeding. 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 Chronic condition, ongoing decline expected.   -  Continue to provide redirection and reassurance as needed. Maintain safe living situation with goals focused on comfort.    Unstageable B/L heel injury (H): wound care. Continue    Diet controlled Diabetes (H): no diet restriction    Primary osteoarthritis involving multiple joints  Polymyalgia rheumatic  Frailty  - analgesia optimal  - Significant  Deficits requiring NH placement. Requiring extensive assistance from nursing. 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 Requiring extensive assistance from nursing. Up for meals only o/w spends the day resting in bed    Order: See above, otherwise, continue the rest of the current POC.       Electronically signed by:  Ruby Vasquez MD     Video-Visit Details  Type of service:  Video Visit  Video End Time (time video stopped): 12:27  Distant Location (provider location):  [facility] "       Sincerely,        Ruby Vasquez MD

## 2020-07-30 NOTE — LETTER
7/30/2020        RE: Chaparro Foley  [address] [facility] [address]        [facility]  [place] Medical Record Number:  [id number]  Place of Service where encounter took place:  [facility] (S) [[id number]]  Chief Complaint   Patient presents with     Nursing Home Acute       HPI:    Chaparro Foley  is a 89 year old (9/15/1927), who is being seen today for an episodic care visit.  HPI information obtained from: facility chart records, facility staff and patient report. Today's concern is:    Patient was seen for face to face visit for as needed lorazepam   This was started by hospice (due to GI bleed and suspect metastatic cancer)   Per nursing patient has frequent episodes of anxiety- reporting that he is unable to breath, unable swallow, unable to talk at various times and appears in no such distress   He also reports at times that his legs cannot move as they are covered in blood- they are not     He has been given lorazepam 4 times this month and it has been noted to be effective.       Past Medical and Surgical History reviewed in Epic today.    MEDICATIONS:    Current Outpatient Medications   Medication Sig Dispense Refill     acetaminophen (TYLENOL) 500 MG tablet Take 1,000 mg by mouth 3 times daily       amLODIPine (NORVASC) 5 MG tablet 2.5 mg by Oral or Feeding Tube route daily       Ca Phosphate-Cholecalciferol (CALCIUM/VITAMIN D3 GUMMIES) 200-200 MG-UNIT CHEW 1 Dose by Oral or Feeding Tube route daily       Cholecalciferol (VITAMIN D3) 50 MCG (2000 UT) TABS Take 2,000 Units by mouth daily       dexamethasone (DECADRON) 2 MG tablet Take 2 mg by mouth every morning Give one tablet by mouth daily with meal       FLEET MINERAL OIL RE Place 133 mLs rectally daily as needed       hydrocortisone (ANUSOL-HC) 25 MG suppository Place 25 mg rectally daily as needed for hemorrhoids       LORazepam (ATIVAN) 0.5 MG tablet Take 0.5 mg by mouth every 6 hours as needed for  "agitation or anxiety       morphine 2.5 MG solu-tab Take 2.5 mg by mouth every 4 hours as needed for shortness of breath / dyspnea or moderate to severe pain       polyethylene glycol (MIRALAX) 17 g packet Take 1 packet by mouth daily       senna (SENNA-TIME) 8.6 MG tablet Take 2 tablets by mouth 2 times daily as needed for constipation Take 2 tabs PO daily Hold x 1 dose for loose stools bowel regulation       traMADol (ULTRAM) 50 MG tablet Take 50 mg by mouth every 4 hours as needed           REVIEW OF SYSTEMS:  4 point ROS including Respiratory, CV, GI and , other than that noted in the HPI,  is negative    Objective:  /60   Pulse 67   Temp 98.1  F (36.7  C)   Resp 18   Ht 1.778 m (5' 10\")   Wt 76.8 kg (169 lb 4.8 oz)   SpO2 98%   BMI 24.29 kg/m    Exam:  GENERAL APPEARANCE:  Alert, in no distress  CV:  Palpation and auscultation of heart done , regular rate and rhythm, no murmur, rub, or gallop  ABDOMEN:  normal bowel sounds, soft, nontender, no hepatosplenomegaly or other masses  PSYCH:  memory impaired , affect and mood normal    Labs:   Labs done in SNF are in [place] EPIC. Please refer to them using [employer] Everywhere.    ASSESSMENT/PLAN:  Hospice care patient  Acute GI bleeding, suspected metastatic cancer ?colon- declined further work up   [facility]      Anxiety  Continuation of PRN order for non-antipsychotic psychotropic medication,anxiety , is appropriate due to sporadic anxiety and is being reordered today with an end date of 60 days.  Nsg to update [facility] provider of frequency of use 7 days prior to end date.           Orders written by provider at facility      Total time spent with patient visit at the skilled nursing facility was 15 min including patient visit. Greater than 50% of total time spent with counseling and coordinating care due to need for face face for psychotropic. .  Electronically signed by:  Mili Hammond NP               Sincerely,        Mili Hammond, " NP

## 2020-07-30 NOTE — PROGRESS NOTES
[facility]  [place] Medical Record Number:  [id number]  Place of Service where encounter took place:  [facility] (S) [[id number]]  Chief Complaint   Patient presents with     Nursing Home Acute       HPI:    Chaparro Foley  is a 89 year old (9/15/1927), who is being seen today for an episodic care visit.  HPI information obtained from: facility chart records, facility staff and patient report. Today's concern is:    Patient was seen for face to face visit for as needed lorazepam   This was started by hospice (due to GI bleed and suspect metastatic cancer)   Per nursing patient has frequent episodes of anxiety- reporting that he is unable to breath, unable swallow, unable to talk at various times and appears in no such distress   He also reports at times that his legs cannot move as they are covered in blood- they are not     He has been given lorazepam 4 times this month and it has been noted to be effective.       Past Medical and Surgical History reviewed in Epic today.    MEDICATIONS:    Current Outpatient Medications   Medication Sig Dispense Refill     acetaminophen (TYLENOL) 500 MG tablet Take 1,000 mg by mouth 3 times daily       amLODIPine (NORVASC) 5 MG tablet 2.5 mg by Oral or Feeding Tube route daily       Ca Phosphate-Cholecalciferol (CALCIUM/VITAMIN D3 GUMMIES) 200-200 MG-UNIT CHEW 1 Dose by Oral or Feeding Tube route daily       Cholecalciferol (VITAMIN D3) 50 MCG (2000 UT) TABS Take 2,000 Units by mouth daily       dexamethasone (DECADRON) 2 MG tablet Take 2 mg by mouth every morning Give one tablet by mouth daily with meal       FLEET MINERAL OIL RE Place 133 mLs rectally daily as needed       hydrocortisone (ANUSOL-HC) 25 MG suppository Place 25 mg rectally daily as needed for hemorrhoids       LORazepam (ATIVAN) 0.5 MG tablet Take 0.5 mg by mouth every 6 hours as needed for agitation or anxiety       morphine 2.5 MG solu-tab Take 2.5 mg by mouth every 4 hours as  "needed for shortness of breath / dyspnea or moderate to severe pain       polyethylene glycol (MIRALAX) 17 g packet Take 1 packet by mouth daily       senna (SENNA-TIME) 8.6 MG tablet Take 2 tablets by mouth 2 times daily as needed for constipation Take 2 tabs PO daily Hold x 1 dose for loose stools bowel regulation       traMADol (ULTRAM) 50 MG tablet Take 50 mg by mouth every 4 hours as needed           REVIEW OF SYSTEMS:  4 point ROS including Respiratory, CV, GI and , other than that noted in the HPI,  is negative    Objective:  /60   Pulse 67   Temp 98.1  F (36.7  C)   Resp 18   Ht 1.778 m (5' 10\")   Wt 76.8 kg (169 lb 4.8 oz)   SpO2 98%   BMI 24.29 kg/m    Exam:  GENERAL APPEARANCE:  Alert, in no distress  CV:  Palpation and auscultation of heart done , regular rate and rhythm, no murmur, rub, or gallop  ABDOMEN:  normal bowel sounds, soft, nontender, no hepatosplenomegaly or other masses  PSYCH:  memory impaired , affect and mood normal    Labs:   Labs done in SNF are in [place] EPIC. Please refer to them using [employer] Everywhere.    ASSESSMENT/PLAN:  Hospice care patient  Acute GI bleeding, suspected metastatic cancer ?colon- declined further work up   [facility]      Anxiety  Continuation of PRN order for non-antipsychotic psychotropic medication,anxiety , is appropriate due to sporadic anxiety and is being reordered today with an end date of 60 days.  Nsg to update [facility] provider of frequency of use 7 days prior to end date.           Orders written by provider at facility      Total time spent with patient visit at the skilled nursing facility was 15 min including patient visit. Greater than 50% of total time spent with counseling and coordinating care due to need for face face for psychotropic. .  Electronically signed by:  Mili Hammond NP           "

## 2020-08-12 NOTE — PROGRESS NOTES
[place] [facility]  Chief Complaint   Patient presents with     care home Regulatory     [facility] Record Number:  [id number]  Place of Service where encounter took place:  [facility] (S) [[id number]]    HPI:    Chaparro Foley  is 89 year old (9/15/1927), who is being seen today for a federally mandated E/M visit.  HPI information obtained from: facility chart records, facility staff and patient report. Today's concerns are:    Patient denies any concerns  Denies any pain   Nursing reports that he will state state he cannot swallow and will refuse pills although he does not seem in any distress during such events       ALLERGIES:Lisinopril; Losartan; Atorvastatin; and Penicillins  PAST MEDICAL HISTORY:   has a past medical history of Chronic kidney disease, Diabetes mellitus, type 2 (H), Hyperlipidemia, and Hypertension.  PAST SURGICAL HISTORY:   has a past surgical history that includes Cholecystectomy.  FAMILY HISTORY: family history includes Brain Tumor in his son; Heart Failure in his mother.  SOCIAL HISTORY:      MEDICATIONS:  Current Outpatient Medications   Medication Sig Dispense Refill     acetaminophen (TYLENOL) 500 MG tablet Take 1,000 mg by mouth 3 times daily       amLODIPine (NORVASC) 5 MG tablet 2.5 mg by Oral or Feeding Tube route daily       Ca Phosphate-Cholecalciferol (CALCIUM/VITAMIN D3 GUMMIES) 200-200 MG-UNIT CHEW 1 Dose by Oral or Feeding Tube route daily       Cholecalciferol (VITAMIN D3) 50 MCG (2000 UT) TABS Take 2,000 Units by mouth daily       dexamethasone (DECADRON) 2 MG tablet Take 2 mg by mouth every morning Give one tablet by mouth daily with meal       FLEET MINERAL OIL RE Place 133 mLs rectally daily as needed       hydrocortisone (ANUSOL-HC) 25 MG suppository Place 25 mg rectally daily as needed for hemorrhoids       LORazepam (ATIVAN) 0.5 MG tablet Take 0.5 mg by mouth every 6 hours as needed for agitation or anxiety       morphine 2.5 MG  "solu-tab Take 2.5 mg by mouth every 4 hours as needed for shortness of breath / dyspnea or moderate to severe pain       polyethylene glycol (MIRALAX) 17 g packet Take 1 packet by mouth daily       senna (SENNA-TIME) 8.6 MG tablet Take 2 tablets by mouth 2 times daily as needed for constipation Take 2 tabs PO daily Hold x 1 dose for loose stools bowel regulation       traMADol (ULTRAM) 50 MG tablet Take 50 mg by mouth every 4 hours as needed           Case Management:  I have reviewed the care plan and MDS and do agree with the plan. Patient's desire to return to the community is present, but is not able due to care needs . Information reviewed:  Medications, vital signs, orders, and nursing notes.    ROS:  4 point ROS including Respiratory, CV, GI and , other than that noted in the HPI,  is negative    Vitals:  /83   Pulse 62   Temp 97.7  F (36.5  C)   Resp 12   Ht 1.778 m (5' 10\")   Wt 73.5 kg (162 lb 1.6 oz)   SpO2 96%   BMI 23.26 kg/m    Body mass index is 23.26 kg/m .  Exam:  GENERAL APPEARANCE:  Alert, in no distress  RESP:  respiratory effort and palpation of chest normal, lungs clear to auscultation , no respiratory distress  CV:  Palpation and auscultation of heart done , regular rate and rhythm, no murmur, rub, or gallop  ABDOMEN:  normal bowel sounds, soft, nontender, no hepatosplenomegaly or other masses  M/S:   Gait and station abnormal EZ lift  Digits and nails normal  SKIN:  Inspection of skin and subcutaneous tissue baseline, Palpation of skin and subcutaneous tissue baseline  NEURO:   Cranial nerves 2-12 are normal tested and grossly at patient's baseline  PSYCH:  affect and mood normal    Lab/Diagnostic data:   Labs done in SNF are in [place] EPIC. Please refer to them using EPIC/Care Everywhere.    ASSESSMENT/PLAN  Hospice care patient  Hilar mass   CT Abdomen pelvis showed new lobulated low-density right hilar mass, worrisome for metastatic lymphadenopathy, new nodular enlargement " of both adrenal glands 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- patient declined further work up     Patient is now enrolled in hospice through [facility]   Symptoms well managed   Has occasional episodes where he tells nurse he cannot swallow- nursing reports that patient is able to talk and does not appear in any distress in such events- recommend continuing current regimen and allow patient to decline medication if needed      Acute GI bleeding  Acute blood loss anemia  Felt to be diverticular bleeding   No active bleeding currently      Acute pulmonary embolism without acute cor pulmonale, unspecified pulmonary embolism type (H)  Noted on CT scan   Not on  anticoagulation Due to goals of care and HX  GI bleed     Primary osteoarthritis involving multiple joints  On tylenol scheduled and tramadol  Requires EZ lift for transfers and extensive assistance from nursing with ADLS      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 Greater than 50% of total time spent with counseling and coordinating care due to regulatory visit in hospice patient with multiple co morbities..    Electronically signed by:  Mili Hammond NP

## 2020-08-13 NOTE — LETTER
8/13/2020        RE: Chaparro Foley  [address] [facility] [address]        [place] [facility]  Chief Complaint   Patient presents with     half-way Regulatory     [place] Medical Record Number:  [id number]  Place of Service where encounter took place:  [facility] ([facility]) [[id number]]    HPI:    Chaparro Foley  is 89 year old (9/15/1927), who is being seen today for a federally mandated E/M visit.  HPI information obtained from: facility chart records, facility staff and patient report. Today's concerns are:    Patient denies any concerns  Denies any pain   Nursing reports that he will state state he cannot swallow and will refuse pills although he does not seem in any distress during such events       ALLERGIES:Lisinopril; Losartan; Atorvastatin; and Penicillins  PAST MEDICAL HISTORY:   has a past medical history of Chronic kidney disease, Diabetes mellitus, type 2 (H), Hyperlipidemia, and Hypertension.  PAST SURGICAL HISTORY:   has a past surgical history that includes Cholecystectomy.  FAMILY HISTORY: family history includes Brain Tumor in his son; Heart Failure in his mother.  SOCIAL HISTORY:      MEDICATIONS:  Current Outpatient Medications   Medication Sig Dispense Refill     acetaminophen (TYLENOL) 500 MG tablet Take 1,000 mg by mouth 3 times daily       amLODIPine (NORVASC) 5 MG tablet 2.5 mg by Oral or Feeding Tube route daily       Ca Phosphate-Cholecalciferol (CALCIUM/VITAMIN D3 GUMMIES) 200-200 MG-UNIT CHEW 1 Dose by Oral or Feeding Tube route daily       Cholecalciferol (VITAMIN D3) 50 MCG (2000 UT) TABS Take 2,000 Units by mouth daily       dexamethasone (DECADRON) 2 MG tablet Take 2 mg by mouth every morning Give one tablet by mouth daily with meal       FLEET MINERAL OIL RE Place 133 mLs rectally daily as needed       hydrocortisone (ANUSOL-HC) 25 MG suppository Place 25 mg rectally daily as needed for hemorrhoids       LORazepam (ATIVAN) 0.5 MG tablet Take 0.5  "mg by mouth every 6 hours as needed for agitation or anxiety       morphine 2.5 MG solu-tab Take 2.5 mg by mouth every 4 hours as needed for shortness of breath / dyspnea or moderate to severe pain       polyethylene glycol (MIRALAX) 17 g packet Take 1 packet by mouth daily       senna (SENNA-TIME) 8.6 MG tablet Take 2 tablets by mouth 2 times daily as needed for constipation Take 2 tabs PO daily Hold x 1 dose for loose stools bowel regulation       traMADol (ULTRAM) 50 MG tablet Take 50 mg by mouth every 4 hours as needed           Case Management:  I have reviewed the care plan and MDS and do agree with the plan. Patient's desire to return to the community is present, but is not able due to care needs . Information reviewed:  Medications, vital signs, orders, and nursing notes.    ROS:  4 point ROS including Respiratory, CV, GI and , other than that noted in the HPI,  is negative    Vitals:  /83   Pulse 62   Temp 97.7  F (36.5  C)   Resp 12   Ht 1.778 m (5' 10\")   Wt 73.5 kg (162 lb 1.6 oz)   SpO2 96%   BMI 23.26 kg/m    Body mass index is 23.26 kg/m .  Exam:  GENERAL APPEARANCE:  Alert, in no distress  RESP:  respiratory effort and palpation of chest normal, lungs clear to auscultation , no respiratory distress  CV:  Palpation and auscultation of heart done , regular rate and rhythm, no murmur, rub, or gallop  ABDOMEN:  normal bowel sounds, soft, nontender, no hepatosplenomegaly or other masses  M/S:   Gait and station abnormal EZ lift  Digits and nails normal  SKIN:  Inspection of skin and subcutaneous tissue baseline, Palpation of skin and subcutaneous tissue baseline  NEURO:   Cranial nerves 2-12 are normal tested and grossly at patient's baseline  PSYCH:  affect and mood normal    Lab/Diagnostic data:   Labs done in SNF are in [place] EPIC. Please refer to them using [facility]/Care Everywhere.    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- patient declined further work up     Patient is now enrolled in hospice through [facility]   Symptoms well managed   Has occasional episodes where he tells nurse he cannot swallow- nursing reports that patient is able to talk and does not appear in any distress in such events- recommend continuing current regimen and allow patient to decline medication if needed      Acute GI bleeding  Acute blood loss anemia  Felt to be diverticular bleeding   No active bleeding currently      Acute pulmonary embolism without acute cor pulmonale, unspecified pulmonary embolism type (H)  Noted on CT scan   Not on  anticoagulation Due to goals of care and HX  GI bleed     Primary osteoarthritis involving multiple joints  On tylenol scheduled and tramadol  Requires EZ lift for transfers and extensive assistance from nursing with ADLS      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 Greater than 50% of total time spent with counseling and coordinating care due to regulatory visit in hospice patient with multiple co morbities..    Electronically signed by:  Mili Hammond NP              Sincerely,        Mili Hammond NP

## 2020-09-01 NOTE — PROGRESS NOTES
"[place] [facility]  [place] Medical Record Number:  [id number]  Place of Service where encounter took place:  [facility] ([facility]) [[id number]]  Chief Complaint   Patient presents with     Nursing Home Acute       HPI:    Chaparro Foley  is a 89 year old (9/15/1927), who is being seen today for an episodic care visit.  HPI information obtained from: facility chart records, facility staff and patient report. Today's concern is:    Note left from [facility] RN with concerns of patient being depressed.   Nursing reports that patient continues to refuse to take medication and eat at times due to reporting that he is unable to swallow. These episodes are very variable. He will report this in the AM and by afternoon is able to eat. He denies any pain in throat. He often refuses to take his medications due to this MAR reflects this as well.    When asked about pain he reports pain in his buttock from pressure sore. He tells me he is unable to sit in his chair due to this. He reports laying supine feels best. Nursing tries to reposition on side but patient reports has a hard time tolerating due to pain in hip and knees.    He tells me he is very frustrated by his current situations.   Noted that he has had weight loss:    Wt Readings from Last 10 Encounters:   09/01/20 73.5 kg (162 lb 1.6 oz)   08/13/20 73.5 kg (162 lb 1.6 oz)   07/30/20 76.8 kg (169 lb 4.8 oz)   07/06/20 77.9 kg (171 lb 12.8 oz)   06/25/20 81.2 kg (179 lb)       For pain control he has as needed tramadol and morphine- which he does not use often   He was also started on decadron for \"inflammatory pain\" in July   He is on a air mattress and had chair cushion   Nursing is apply barrier cream on buttocks  Past Medical and Surgical History reviewed in Epic today.    MEDICATIONS:    Current Outpatient Medications   Medication Sig Dispense Refill     acetaminophen (TYLENOL) 500 MG tablet Take 1,000 mg by mouth 3 times daily       FLEET " MINERAL OIL RE Place 133 mLs rectally daily as needed       hydrocortisone (ANUSOL-HC) 25 MG suppository Place 25 mg rectally daily as needed for hemorrhoids       LORazepam (ATIVAN) 0.5 MG tablet Take 0.5 mg by mouth every 6 hours as needed for agitation or anxiety       morphine 2.5 MG solu-tab Take 2.5 mg by mouth 2 times daily And 2.5mg every 4 hours PRN severe pain/SOA       polyethylene glycol (MIRALAX) 17 g packet Take 1 packet by mouth daily       senna (SENNA-TIME) 8.6 MG tablet Take 2 tablets by mouth 2 times daily as needed for constipation Take 2 tabs PO daily Hold x 1 dose for loose stools bowel regulation           REVIEW OF SYSTEMS:  4 point ROS including Respiratory, CV, GI and , other than that noted in the HPI,  is negative  Objective:  /83   Pulse 57   Temp 97  F (36.1  C)   Resp 12   Wt 73.5 kg (162 lb 1.6 oz)   SpO2 96%   BMI 23.26 kg/m    Exam:  GENERAL APPEARANCE:  Alert, in no distress  NECK:  No adenopathy,masses or thyromegaly  RESP:  respiratory effort and palpation of chest normal, lungs clear to auscultation , no respiratory distress  CV:  Palpation and auscultation of heart done , regular rate and rhythm, no murmur, rub, or gallop  ABDOMEN:  normal bowel sounds, soft, nontender, no hepatosplenomegaly or other masses  Mouth: no signs of thrush, WNL   LYMPHATICS:  No adenopathy in neck   M/S:   Gait and station abnormal transfers with assistance  SKIN:  non blanchable erythema of bilateral buttocks, no open area noted  NEURO:   Cranial nerves 2-12 are normal tested and grossly at patient's baseline  PSYCH:  irritable with answering questions seems better when explaining trying to help him feel better     Labs:   Labs done in SNF are in [place] EPIC. Please refer to them using EPIC/Care Everywhere.    ASSESSMENT/PLAN:  [facility]  Hospice care patient  On hospice through [facility]   I called and spoke with hospice triage and gave update on plan of care along with my cell phone  number for further questions/concerns    Has lorazepam order- rare use   Will continue for agitation related to terminal diagnosis     Dysphagia, unspecified type  Unclear etiology   Intermittent   Reviewed medication list and will stop all non essential   ?if related to decadron? Esophagitis-this started weeks after decadron was started- will stop this today and monitor   Continue to offer foods and fluids      Pressure injury of buttock, stage 1, unspecified laterality  Reports pain related to this   Will stop the tramadol (not best option for pain control if unable to swallow)   Will scheduled morphine twice a day and continue as needed   Continue current wound care and pressure relieving measures as recommended by nursing     HTN  BP noted to be normal despite often refusing norvasc  Will stop today       Weight loss   Noted today   Expect ongoing decline due to suspect cancer diagnosis/hilar mass   On hospice   Continue to offer foods and fluids       transcribed by : Alice Gustafson  1. Discontinue amlodipine, calcium with vitamin D, Vitamin D and tramadol  2. Morphine Solu tab 2.5mg PO BID and 2.5mg every 4 hours PRN - severe pain/SOA (Rx included)  3. Discontinue decodran may be contributing to irritability and throat complaints.  4. Continue lorazepam 0.5mg solutab every 6 hours PRN - agitation/anxiety. Will reassess in 60 days    Total time spent with patient visit at the skilled nursing facility was 35 min including patient visit, review of past records and phone call to hospice. Greater than 50% of total time spent with counseling and coordinating care due to pain control, dysphagia in hospice patient..  Electronically signed by:  Mili Hammond NP

## 2020-09-01 NOTE — LETTER
"    9/1/2020        RE: Chaparro Foley  [address] [facility] [address]        [place] [facility]  [place] Medical Record Number:  [id number]  Place of Service where encounter took place:  [facility] ([facility]) [[id number]]  Chief Complaint   Patient presents with     Nursing Home Acute       HPI:    Chaparro Foley  is a 89 year old (9/15/1927), who is being seen today for an episodic care visit.  HPI information obtained from: facility chart records, facility staff and patient report. Today's concern is:    Note left from [facility] RN with concerns of patient being depressed.   Nursing reports that patient continues to refuse to take medication and eat at times due to reporting that he is unable to swallow. These episodes are very variable. He will report this in the AM and by afternoon is able to eat. He denies any pain in throat. He often refuses to take his medications due to this MAR reflects this as well.    When asked about pain he reports pain in his buttock from pressure sore. He tells me he is unable to sit in his chair due to this. He reports laying supine feels best. Nursing tries to reposition on side but patient reports has a hard time tolerating due to pain in hip and knees.    He tells me he is very frustrated by his current situations.   Noted that he has had weight loss:    Wt Readings from Last 10 Encounters:   09/01/20 73.5 kg (162 lb 1.6 oz)   08/13/20 73.5 kg (162 lb 1.6 oz)   07/30/20 76.8 kg (169 lb 4.8 oz)   07/06/20 77.9 kg (171 lb 12.8 oz)   06/25/20 81.2 kg (179 lb)       For pain control he has as needed tramadol and morphine- which he does not use often   He was also started on decadron for \"inflammatory pain\" in July   He is on a air mattress and had chair cushion   Nursing is apply barrier cream on buttocks  Past Medical and Surgical History reviewed in Epic today.    MEDICATIONS:    Current Outpatient Medications   Medication Sig Dispense Refill     " acetaminophen (TYLENOL) 500 MG tablet Take 1,000 mg by mouth 3 times daily       FLEET MINERAL OIL RE Place 133 mLs rectally daily as needed       hydrocortisone (ANUSOL-HC) 25 MG suppository Place 25 mg rectally daily as needed for hemorrhoids       LORazepam (ATIVAN) 0.5 MG tablet Take 0.5 mg by mouth every 6 hours as needed for agitation or anxiety       morphine 2.5 MG solu-tab Take 2.5 mg by mouth 2 times daily And 2.5mg every 4 hours PRN severe pain/SOA       polyethylene glycol (MIRALAX) 17 g packet Take 1 packet by mouth daily       senna (SENNA-TIME) 8.6 MG tablet Take 2 tablets by mouth 2 times daily as needed for constipation Take 2 tabs PO daily Hold x 1 dose for loose stools bowel regulation           REVIEW OF SYSTEMS:  4 point ROS including Respiratory, CV, GI and , other than that noted in the HPI,  is negative  Objective:  /83   Pulse 57   Temp 97  F (36.1  C)   Resp 12   Wt 73.5 kg (162 lb 1.6 oz)   SpO2 96%   BMI 23.26 kg/m    Exam:  GENERAL APPEARANCE:  Alert, in no distress  NECK:  No adenopathy,masses or thyromegaly  RESP:  respiratory effort and palpation of chest normal, lungs clear to auscultation , no respiratory distress  CV:  Palpation and auscultation of heart done , regular rate and rhythm, no murmur, rub, or gallop  ABDOMEN:  normal bowel sounds, soft, nontender, no hepatosplenomegaly or other masses  Mouth: no signs of thrush, WNL   LYMPHATICS:  No adenopathy in neck   M/S:   Gait and station abnormal transfers with assistance  SKIN:  non blanchable erythema of bilateral buttocks, no open area noted  NEURO:   Cranial nerves 2-12 are normal tested and grossly at patient's baseline  PSYCH:  irritable with answering questions seems better when explaining trying to help him feel better     Labs:   Labs done in SNF are in [place] EPIC. Please refer to them using EPIC/Care Everywhere.    ASSESSMENT/PLAN:  [facility]  Hospice care patient  On hospice through [facility]   I called  and spoke with hospice triage and gave update on plan of care along with my cell phone number for further questions/concerns    Has lorazepam order- rare use   Will continue for agitation related to terminal diagnosis     Dysphagia, unspecified type  Unclear etiology   Intermittent   Reviewed medication list and will stop all non essential   ?if related to decadron? Esophagitis-this started weeks after decadron was started- will stop this today and monitor   Continue to offer foods and fluids      Pressure injury of buttock, stage 1, unspecified laterality  Reports pain related to this   Will stop the tramadol (not best option for pain control if unable to swallow)   Will scheduled morphine twice a day and continue as needed   Continue current wound care and pressure relieving measures as recommended by nursing     HTN  BP noted to be normal despite often refusing norvasc  Will stop today       Weight loss   Noted today   Expect ongoing decline due to suspect cancer diagnosis/hilar mass   On hospice   Continue to offer foods and fluids       transcribed by : Alice Gustafson  1. Discontinue amlodipine, calcium with vitamin D, Vitamin D and tramadol  2. Morphine Solu tab 2.5mg PO BID and 2.5mg every 4 hours PRN - severe pain/SOA (Rx included)  3. Discontinue decodran may be contributing to irritability and throat complaints.  4. Continue lorazepam 0.5mg solutab every 6 hours PRN - agitation/anxiety. Will reassess in 60 days    Total time spent with patient visit at the skilled nursing facility was 35 min including patient visit, review of past records and phone call to hospice. Greater than 50% of total time spent with counseling and coordinating care due to pain control, dysphagia in hospice patient..  Electronically signed by:  Mili Hammond NP             Sincerely,        Mili Hammond NP

## 2020-09-10 NOTE — TELEPHONE ENCOUNTER
Routing refill request to provider for review/approval because:  Drug not on the [facility] refill protocol   Medication is reported/historical  sIabela SHAHID RN, BSN

## 2020-09-16 NOTE — PROGRESS NOTES
"[place] [facility] Regulatory   Chaparro Foley is being evaluated via a billable video visit.   The patient has been notified of following:  \"This video visit will be conducted via a call between you and your provider. We have found that certain health care needs can be provided without the need for an in-person physical exam.  This service lets us provide the care you need with a video conversation. If during the course of the call the provider feels a video visit is not appropriate, you will not be charged for this service.\"   The provider has received verbal consent for a Video Visit from the patient or first contact? Yes  Patient or facility staff would like the video invitation sent by: N/A   Video Start Time: 09:07  [place] Medical Record Number:  [id number]  Place of Location at the time of visit: [facility]  Chief Complaint   Patient presents with     Video Visit     Nursing Home Regulatory     HPI:  Chaparro Foley  is a 89 year old (9/15/1927), who is being seen today for a Regulatory visit.  HPI information obtained from: facility staff, patient report and [facility] chart review.     Today's concern is:  - Pt seen in the presence of RN who graciously assisted with the virtual visit.  - Resident reports that not doing too good. denies cough, SOB, chest pain or wheezing. Denies n/v/abd pain, diarreha or constipation. Denies muscles or joints pain. Rn reports Resident gets bad and good days.   - Reports mood is not the best  - Resident reports that he cannot walk. RN reports requires EZ stand.  - RN reports PI over heels (unstageable, blister), one is required and one was present upon admission.   - RN reports issue with swallowing, seems psychosomatic.     -------------------------------------------------------------------------  Past Medical and Surgical History reviewed in Epic today.  MEDICATIONS:    Current Outpatient Medications   Medication Sig Dispense Refill     " acetaminophen (TYLENOL) 500 MG tablet Take 1,000 mg by mouth 3 times daily       FLEET MINERAL OIL RE Place 133 mLs rectally daily as needed       hydrocortisone (ANUSOL-HC) 25 MG suppository Place 25 mg rectally daily as needed for hemorrhoids       LORazepam (ATIVAN) 0.5 MG tablet Take 0.5 mg by mouth every 6 hours as needed for agitation or anxiety       morphine 2.5 MG solu-tab Take 2.5 mg by mouth 2 times daily And 2.5mg every 4 hours PRN severe pain/SOA       polyethylene glycol (MIRALAX) 17 g packet Take 1 packet by mouth daily       senna (SENNA-TIME) 8.6 MG tablet Take 2 tablets by mouth 2 times daily as needed for constipation Take 2 tabs PO daily Hold x 1 dose for loose stools bowel regulation       REVIEW OF SYSTEMS: Unobtainable secondary to cognitive impairment.   Objective: /74   Pulse 66   Temp 97.4  F (36.3  C)   Resp 19   Wt 72.7 kg (160 lb 4.8 oz)   SpO2 94%   BMI 23.00 kg/m    Limited visit exam done given COVID-19 precautions.   GENERAL APPEARANCE:  in no distress  RESP:  unlabored breathing  M/S:   no joint deformity noted  SKIN:  no rash noted  NEURO:   no purposeful movement in upper and lower extremities  PSYCH:  Anxious mood, fair to poor insight, fair judgement. Affected memory.     Labs: no new labs to review.       ASSESSMENT/PLAN:  ---------------------------  Unstageable B/L heel injury (H): wound care. Continue wound care.     Dysphagia, unspecified type: intermittent during same day.     Hx of PE : not a candidate for OAC  Diet controlled Diabetes (H): no diet restriction    Primary osteoarthritis involving multiple joints  Polymyalgia rheumatica  Frailty  - analgesia optimal  - Significant  Deficits requiring NH placement. Requiring extensive assistance from nursing. Up for meals only o/w spends the day resting in bed      Hx of Malignant melanoma of right ear s/p radical dissection  Hx of Diverticular Bleeding and acute loss anemia  Right hilar mass and b/l adrenal mass  concerning for malignancy  Frailty  Hospice  - Significant  Deficits requiring NH placement. Requiring extensive assistance from nursing. Up for meals only o/w spends the day resting in bed  - Appreciate collaboration with  hospice team for symptom management in collaboration with cares for maximum comfort at end-of-life      Order: See above, otherwise, continue the rest of the current POC.     Electronically signed by:  Ruby Vasquez MD     Video-Visit Details  Type of service:  Video Visit  Video End Time (time video stopped): 09:13  Distant Location (provider location):  [facility]

## 2020-09-16 NOTE — LETTER
"    9/16/2020        RE: Chaparro Foley  [address] [facility] [id number]        [facility] Regulatory   Chaparro Foley is being evaluated via a billable video visit.   The patient has been notified of following:  \"This video visit will be conducted via a call between you and your provider. We have found that certain health care needs can be provided without the need for an in-person physical exam.  This service lets us provide the care you need with a video conversation. If during the course of the call the provider feels a video visit is not appropriate, you will not be charged for this service.\"   The provider has received verbal consent for a Video Visit from the patient or first contact? Yes  Patient or facility staff would like the video invitation sent by: N/A   Video Start Time: 09:07  [facility] Medical Record Number:  [id number]  Place of Location at the time of visit: [facility]  Chief Complaint   Patient presents with     Video Visit     Nursing Home Regulatory     HPI:  Chaparro Foley  is a 89 year old (9/15/1927), who is being seen today for a Regulatory visit.  HPI information obtained from: facility staff, patient report and [facility] chart review.     Today's concern is:  - Pt seen in the presence of RN who graciously assisted with the virtual visit.  - Resident reports that not doing too good. denies cough, SOB, chest pain or wheezing. Denies n/v/abd pain, diarreha or constipation. Denies muscles or joints pain. Rn reports Resident gets bad and good days.   - Reports mood is not the best  - Resident reports that he cannot walk. RN reports requires EZ stand.  - RN reports PI over heels (unstageable, blister), one is required and one was present upon admission.   - RN reports issue with swallowing, seems psychosomatic.     -------------------------------------------------------------------------  Past Medical and Surgical History reviewed in Epic " today.  MEDICATIONS:    Current Outpatient Medications   Medication Sig Dispense Refill     acetaminophen (TYLENOL) 500 MG tablet Take 1,000 mg by mouth 3 times daily       FLEET MINERAL OIL RE Place 133 mLs rectally daily as needed       hydrocortisone (ANUSOL-HC) 25 MG suppository Place 25 mg rectally daily as needed for hemorrhoids       LORazepam (ATIVAN) 0.5 MG tablet Take 0.5 mg by mouth every 6 hours as needed for agitation or anxiety       morphine 2.5 MG solu-tab Take 2.5 mg by mouth 2 times daily And 2.5mg every 4 hours PRN severe pain/SOA       polyethylene glycol (MIRALAX) 17 g packet Take 1 packet by mouth daily       senna (SENNA-TIME) 8.6 MG tablet Take 2 tablets by mouth 2 times daily as needed for constipation Take 2 tabs PO daily Hold x 1 dose for loose stools bowel regulation       REVIEW OF SYSTEMS: Unobtainable secondary to cognitive impairment.   Objective: /74   Pulse 66   Temp 97.4  F (36.3  C)   Resp 19   Wt 72.7 kg (160 lb 4.8 oz)   SpO2 94%   BMI 23.00 kg/m    Limited visit exam done given COVID-19 precautions.   GENERAL APPEARANCE:  in no distress  RESP:  unlabored breathing  M/S:   no joint deformity noted  SKIN:  no rash noted  NEURO:   no purposeful movement in upper and lower extremities  PSYCH:  Anxious mood, fair to poor insight, fair judgement. Affected memory.     Labs: no new labs to review.       ASSESSMENT/PLAN:  ---------------------------  Unstageable B/L heel injury (H): wound care. Continue wound care.     Dysphagia, unspecified type: intermittent during same day.     Hx of PE : not a candidate for OAC  Diet controlled Diabetes (H): no diet restriction    Primary osteoarthritis involving multiple joints  Polymyalgia rheumatica  Frailty  - analgesia optimal  - Significant  Deficits requiring NH placement. Requiring extensive assistance from nursing. 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 Requiring extensive assistance from nursing. Up for meals only o/w spends the day resting in bed  - Appreciate collaboration with  hospice team for symptom management in collaboration with cares for maximum comfort at end-of-life      Order: See above, otherwise, continue the rest of the current POC.     Electronically signed by:  Ruby Vasquez MD     Video-Visit Details  Type of service:  Video Visit  Video End Time (time video stopped): 09:13  Distant Location (provider location):  [facility]         Sincerely,        Ruby Vasquez MD

## 2020-10-29 NOTE — LETTER
"    10/29/2020        RE: Chaparro Foley  [address] [facility] [address] [facility] [address]        Telephone visit  [facility]  Chaparro Foley is a 89 year old year old adult who is being evaluated via a billable telephone visit due to the restrictions of the Covid-19 pandemic. The patient has been notified of following: \"This telephone visit will be conducted via a call between you and your provider. We have found that certain health care needs can be provided without the need for a physical exam. This service lets us provide the care you need with a short phone conversation. If a prescription is necessary we will work with the facility you are at and can send it directly to your pharmacy. If lab work is needed we can place an order to have it drawn at the facility you are at. If during the course of the call the provider feels a telephone visit is not appropriate, you will not be charged for this service.\"   Place of Location at the time of visit: [facility]    Chaparro Foley complains of :   Chief Complaint   Patient presents with     Nursing Home Acute     Face to face     I have reviewed and updated the patient's Past Medical History, Social History, Family History and Medication List.  ALLERGIES:   Allergies   Allergen Reactions     Lisinopril Diarrhea     Took for a few months in 2007 and was switched to Cozaar     Losartan Diarrhea     Atorvastatin Muscle Pain (Myalgia) and Diarrhea     Penicillins Swelling     Lower extremity swelling      HPI: HPI information obtained from: facility chart records and facility staff.      Patient had a review fof psychotropic medication  He is on lorazepam 0.5 mg PO every 6 hours as neded as needed for agitation   He is on hopsice through [facility]   He is using this on average once a day   Some days he does not take it and 4 days at out the month he needed 2 doses  Nursing notes reviewed a show that this is effective in treating his " agitation and anxiety and he is able to rest     MEDICATIONS:    Current Outpatient Medications   Medication Sig Dispense Refill     acetaminophen (TYLENOL) 500 MG tablet Take 1,000 mg by mouth 3 times daily       dexamethasone (DECADRON) 4 MG tablet Take 4 mg by mouth daily (with breakfast)       FLEET MINERAL OIL RE Place 133 mLs rectally daily as needed       hydrocortisone (ANUSOL-HC) 25 MG suppository Place 25 mg rectally daily as needed for hemorrhoids       LORazepam (ATIVAN) 0.5 MG tablet Take 0.5 mg by mouth every 6 hours as needed for agitation or anxiety       morphine 5 MG solu-tab Take 5 mg by mouth 3 times daily And every 2 hours PRN severe pain/SOA       polyethylene glycol (MIRALAX) 17 g packet Take 1 packet by mouth daily       senna (SENNA-TIME) 8.6 MG tablet Take 2 tablets by mouth 2 times daily as needed for constipation Take 2 tabs PO daily Hold x 1 dose for loose stools bowel regulation       REVIEW OF SYSTEMS: Unobtainable  Objective: /65   Pulse 52   Temp 98.1  F (36.7  C)   Resp 19   Wt 77.4 kg (170 lb 9.6 oz)   SpO2 97%   BMI 24.48 kg/m    Limited visit exam done given COVID-19 precautions.     Labs:   Labs done in SNF are in [facility] EPIC. Please refer to them using EPIC/Care Everywhere.  Assessment/Plan:  1. Hospice care patient  2. Anxiety    Reviewed psychotropic medication lorazepam with nursing not seen in facility due to COVID outbreak  Patient is currently negative     Will continue lorazepam 0.5 mg every 6 hours as needed for agitation/anxiety causing distress to patient   Reassess in 60 days    Mili Hammond NP         No charge visit as did not have direct contact with patient over phone or face to face.         Sincerely,        Mili Hammond NP

## 2020-10-29 NOTE — PROGRESS NOTES
"Telephone visit  [facility]  Chaparro Foley is a 89 year old year old adult who is being evaluated via a billable telephone visit due to the restrictions of the Covid-19 pandemic. The patient has been notified of following: \"This telephone visit will be conducted via a call between you and your provider. We have found that certain health care needs can be provided without the need for a physical exam. This service lets us provide the care you need with a short phone conversation. If a prescription is necessary we will work with the facility you are at and can send it directly to your pharmacy. If lab work is needed we can place an order to have it drawn at the facility you are at. If during the course of the call the provider feels a telephone visit is not appropriate, you will not be charged for this service.\"   Place of Location at the time of visit: [facility]    Chaparro Foley complains of :   Chief Complaint   Patient presents with     Nursing Home Acute     Face to face     I have reviewed and updated the patient's Past Medical History, Social History, Family History and Medication List.  ALLERGIES:   Allergies   Allergen Reactions     Lisinopril Diarrhea     Took for a few months in 2007 and was switched to Cozaar     Losartan Diarrhea     Atorvastatin Muscle Pain (Myalgia) and Diarrhea     Penicillins Swelling     Lower extremity swelling      HPI: HPI information obtained from: facility chart records and facility staff.      Patient had a review fof psychotropic medication  He is on lorazepam 0.5 mg PO every 6 hours as neded as needed for agitation   He is on hopsice through [facility]   He is using this on average once a day   Some days he does not take it and 4 days at out the month he needed 2 doses  Nursing notes reviewed a show that this is effective in treating his agitation and anxiety and he is able to rest     MEDICATIONS:    Current Outpatient Medications "   Medication Sig Dispense Refill     acetaminophen (TYLENOL) 500 MG tablet Take 1,000 mg by mouth 3 times daily       dexamethasone (DECADRON) 4 MG tablet Take 4 mg by mouth daily (with breakfast)       FLEET MINERAL OIL RE Place 133 mLs rectally daily as needed       hydrocortisone (ANUSOL-HC) 25 MG suppository Place 25 mg rectally daily as needed for hemorrhoids       LORazepam (ATIVAN) 0.5 MG tablet Take 0.5 mg by mouth every 6 hours as needed for agitation or anxiety       morphine 5 MG solu-tab Take 5 mg by mouth 3 times daily And every 2 hours PRN severe pain/SOA       polyethylene glycol (MIRALAX) 17 g packet Take 1 packet by mouth daily       senna (SENNA-TIME) 8.6 MG tablet Take 2 tablets by mouth 2 times daily as needed for constipation Take 2 tabs PO daily Hold x 1 dose for loose stools bowel regulation       REVIEW OF SYSTEMS: Unobtainable  Objective: /65   Pulse 52   Temp 98.1  F (36.7  C)   Resp 19   Wt 77.4 kg (170 lb 9.6 oz)   SpO2 97%   BMI 24.48 kg/m    Limited visit exam done given COVID-19 precautions.     Labs:   Labs done in SNF are in [facility] EPIC. Please refer to them using EPIC/Care Everywhere.  Assessment/Plan:  1. Hospice care patient  2. Anxiety    Reviewed psychotropic medication lorazepam with nursing not seen in facility due to COVID outbreak  Patient is currently negative     Will continue lorazepam 0.5 mg every 6 hours as needed for agitation/anxiety causing distress to patient   Reassess in 60 days    Mili Hammond NP         No charge visit as did not have direct contact with patient over phone or face to face.

## 2020-11-07 NOTE — TELEPHONE ENCOUNTER
Patient tested positive at [place] with PCR through [facility] on 11/6/20.  He is asymptomatic. CODE status DNR/DNI. Isaura WANG

## 2020-11-12 NOTE — TELEPHONE ENCOUNTER
Notified by nursing staff at [facility] that patient passed away this AM.  am  [facility] notified.   Isaura Hammond NP

## 2020-12-01 RX ORDER — MORPHINE SULFATE 100 %
POWDER (GRAM) MISCELLANEOUS
Refills: 0 | OUTPATIENT
Start: 2020-12-01